# Patient Record
Sex: FEMALE | Race: WHITE
[De-identification: names, ages, dates, MRNs, and addresses within clinical notes are randomized per-mention and may not be internally consistent; named-entity substitution may affect disease eponyms.]

---

## 2019-01-02 ENCOUNTER — HOSPITAL ENCOUNTER (OUTPATIENT)
Dept: HOSPITAL 62 - SP | Age: 59
End: 2019-01-02
Attending: ORTHOPAEDIC SURGERY
Payer: COMMERCIAL

## 2019-01-02 DIAGNOSIS — D68.51: Primary | ICD-10-CM

## 2019-01-02 PROCEDURE — 93970 EXTREMITY STUDY: CPT

## 2019-01-02 NOTE — EKG REPORT
SEVERITY:- BORDERLINE ECG -

SINUS RHYTHM

BORDERLINE T ABNORMALITIES, ANTERIOR LEADS

:

Confirmed by: Edilia Omer MD 02-Jan-2019 17:18:10

## 2019-01-02 NOTE — XCELERA REPORT
[address]

                             [facility] [address]

                               Tel: [phone]

                               Fax: [phone]



                       Lower Extremity Venous Evaluation

_______________________________________________________________________________



_______________________________________________________________________________

Procedure: Color flow and duplex imaging bilaterally of the veins of the lower

extremities as well as the Common Femoral veins.





_______________________________________________________________________________



_______________________________________________________________________________



Right Sided Venous Evaluation

Normal vessel filling wall to wall, compression and augmentation as well as

Colour flow down to the infrageniculate veins.



Left Sided Venous Evaluation

Normal vessel filling wall to wall, compression and augmentation as well as

Colour flow down to the infrageniculate veins.



_______________________________________________________________________________



Interpretation Summary

No duplex evidence of DVT or obstruction in the bilateral lower extremities.

_______________________________________________________________________________



Name: EARL GEORGE

MRN: [id number]

Age: 58 yrs

Gender: Female                                         : 1960

Patient Status: Outpatient                             Patient Location: 

Account #: [id number]

Study Date: 2019 10:01 AM

                            Accession #: [id number]

Reason For Study: ACTIVATED PROTEIN C RESISTANCE

Ordering Physician: LOTTIE JON

Performed By: Malathi Martinez

Electronically signed by:      Lennox Williams      on 2019 12:13 PM



CC: LOTTIE JON

>

Williams, Lennox

## 2019-01-09 ENCOUNTER — HOSPITAL ENCOUNTER (OUTPATIENT)
Dept: HOSPITAL 62 - OROUT | Age: 59
Discharge: HOME | End: 2019-01-09
Attending: ORTHOPAEDIC SURGERY
Payer: COMMERCIAL

## 2019-01-09 VITALS — DIASTOLIC BLOOD PRESSURE: 87 MMHG | SYSTOLIC BLOOD PRESSURE: 133 MMHG

## 2019-01-09 DIAGNOSIS — M48.062: ICD-10-CM

## 2019-01-09 DIAGNOSIS — E78.00: ICD-10-CM

## 2019-01-09 DIAGNOSIS — M51.17: ICD-10-CM

## 2019-01-09 DIAGNOSIS — M51.27: Primary | ICD-10-CM

## 2019-01-09 DIAGNOSIS — M51.36: ICD-10-CM

## 2019-01-09 PROCEDURE — 93010 ELECTROCARDIOGRAM REPORT: CPT

## 2019-01-09 PROCEDURE — 87070 CULTURE OTHR SPECIMN AEROBIC: CPT

## 2019-01-09 PROCEDURE — 93005 ELECTROCARDIOGRAM TRACING: CPT

## 2019-01-09 PROCEDURE — 63047 LAM FACETEC & FORAMOT LUMBAR: CPT

## 2019-01-09 PROCEDURE — 72020 X-RAY EXAM OF SPINE 1 VIEW: CPT

## 2019-01-09 PROCEDURE — C9290 INJ, BUPIVACAINE LIPOSOME: HCPCS

## 2019-01-09 RX ADMIN — FENTANYL CITRATE ONE MCG: 50 INJECTION INTRAMUSCULAR; INTRAVENOUS at 10:02

## 2019-01-09 RX ADMIN — FENTANYL CITRATE ONE MCG: 50 INJECTION INTRAMUSCULAR; INTRAVENOUS at 10:14

## 2019-01-09 NOTE — RADIOLOGY REPORT (SQ)
EXAM DESCRIPTION:  NO CHG FLUORO; SPINE SINGLE VIEW



COMPLETED DATE/TIME:  1/9/2019 10:31 am



REASON FOR STUDY:  LUMBAR LAMINECTOMY ASST W/ FLUORO IN OR M51.26  OTHER INTERVERTEBRAL DISC DISPLACE
MENT, LUMBAR REGION M48.062  SPINAL STENOSIS, LUMBAR REGION WITH NEUROGENIC RACHEL M51.36  OTHER INTER
VERTEBRAL DISC DEGENERATION, LUMBAR REGION



COMPARISON:  None.



FLUOROSCOPY TIME:  0.1

1 images saved to PACS



TECHNIQUE:  Intra-operative images acquired during surgical procedure to evaluate progress.

NUMBER OF IMAGES: 1



LIMITATIONS:  None.



FINDINGS:  Intraoperative fluoro image was obtained to evaluate progress.  Please see operative repor
t for detailed description.



IMPRESSION:  IMAGE(S) OBTAINED DURING PROCEDURE.  Please see operative report for detailed descriptio
n.



COMMENT:  Quality :  Final reports for procedures using fluoroscopy that document radiation exp
osure indices, or exposure time and number of fluorographic images (if radiation exposure indices are
 not available)

Please consult full operative report of the attending physician for description of the procedure.



TECHNICAL DOCUMENTATION:  JOB ID:  [id number]

 2011 [employer]- All Rights Reserved



Reading location - IP/workstation name: [facility]

## 2019-01-09 NOTE — OPERATIVE REPORT E
Operative Report



NAME: EARL GEORGE

MRN:  [id number]          : 1960 AGE:  58Y

DATE OF SURGERY: 2019                        ROOM:



PREOPERATIVE DIAGNOSIS:

L5-S1 stenosis, L5-S1 herniated nucleus pulposus, and L5 and S1

radiculitis right-sided, back pain, degenerative joint disease.



POSTOPERATIVE DIAGNOSIS:

L5-S1 stenosis, L5-S1 herniated nucleus pulposus, and L5 and S1

radiculitis right-sided, back pain, degenerative joint disease.



OPERATION:

L5 partial laminectomy, L5-S1 right-sided microdiskectomy.



SURGEON:

LOTTIE JON M.D.



ASSISTANT:

Aquiles Gardner PA-C



OPERATIVE INDICATIONS:

The patient is a 58-year-old female with significant numbness and weakness

in the right lower extremity.  After discussion with the patient of the

risks, indications, and alternatives including the risk of infection,

bleeding, damage to nerves or blood vessels, the risk of spinal headaches,

the risk of continued back pain, the patient understood these risks and

wished to pursue the option of surgical intervention.



PROCEDURE:

The patient was brought into the room and placed under anesthesia. 

Received 2 grams of Ancef within one hour of cut time.  Had SCDs and a

warm blanket placed on her.  The patient was positioned on the radiolucent

table with a Efrem frame attachment.  The patient was placed in

appropriate position on the Efrem frame.  The Efrem frame was turned up

to open up the interspaces serially.  Under C-arm fluoroscopy in the

lateral position, the L5-S1 level was marked.  Upon marking that level and

marking the skin, after completion of prepping and draping, a midline

incision was carried out through the skin.  Dissection was carried down to

the lumbodorsal fascia.  The lumbodorsal fascia was incised on both sides

of the spinous processes, down to L5 and S1, down into the lamina of L5

and S1.  A Kocher was used to nishant the posterior interspace, and upon

verification with lateral C-arm fluoroscopy, the posterior interspace was

verified.  The spinous process of L5 was resected, and then the microscope

was brought in.  Under the microscope and with the assistance of Aquiles Gardner, partial laminectomy was performed and partial facetectomy and

lateral recess decompression was carried out, especially on the right

side.  There was significant stenosis and the dura was identified, and

nerve roots identified.  The dura and the nerve root were distracted

medially with the assistance of Aquiles Gardner, under the microscope.  The

disk space was identified.  The needle was placed in the disk space. 

Lateral C-arm fluoroscopy was used to verify that level, and upon

verification of that level, *------* was performed.  Many small and large

degenerative pieces of disk were resected, and then the dura was found to

rest more ventrally.  After that, using bipolar electrocautery to

coagulate small *------* epidural bleeders, the Valsalva maneuver was

performed by Anesthesia noting no cerebrospinal fluid leakage.  The wound

was irrigated with 1 liter of Bacitracin irrigation.  The fascia was

reapproximated with 0 Vicryl.  Deep and superficial soft tissues are

infiltrated with 0.5% Marcaine of 20 mL, and 20 mL of 1.3% EXPAREL.  The

subcu was reapproximated with 2-0 Vicryl, and the skin with running

subcuticular 3-0 Monocryl closure.  Wounds were dressed in benzoin,

Steri-Strips, 4 x 4, and tape.  The patient tolerated the procedure well. 

Condition is stable.  Disposition to recovery room.  Please note this

procedure could not have been done without the assistance of Aquiles Gardner PA-C.



DICTATING PHYSICIAN:  LOTTIE JON M.D.





[id number]                  DT: 2019    [id number]

KHLOE#: [id number]            DD: 2019    0959

ID:   [id number]           JOB#: [id number]       ACCT: [id number]



cc:LOTTIE JON M.D.

>

## 2019-01-09 NOTE — RADIOLOGY REPORT (SQ)
EXAM DESCRIPTION:  NO CHG FLUORO; SPINE SINGLE VIEW



COMPLETED DATE/TIME:  1/9/2019 10:31 am



REASON FOR STUDY:  LUMBAR LAMINECTOMY ASST W/ FLUORO IN OR M51.26  OTHER INTERVERTEBRAL DISC DISPLACE
MENT, LUMBAR REGION M48.062  SPINAL STENOSIS, LUMBAR REGION WITH NEUROGENIC RACHEL M51.36  OTHER INTER
VERTEBRAL DISC DEGENERATION, LUMBAR REGION



COMPARISON:  None.



FLUOROSCOPY TIME:  0.1

1 images saved to PACS



TECHNIQUE:  Intra-operative images acquired during surgical procedure to evaluate progress.

NUMBER OF IMAGES: 1



LIMITATIONS:  None.



FINDINGS:  Intraoperative fluoro image was obtained to evaluate progress.  Please see operative repor
t for detailed description.



IMPRESSION:  IMAGE(S) OBTAINED DURING PROCEDURE.  Please see operative report for detailed descriptio
n.



COMMENT:  Quality :  Final reports for procedures using fluoroscopy that document radiation exp
osure indices, or exposure time and number of fluorographic images (if radiation exposure indices are
 not available)

Please consult full operative report of the attending physician for description of the procedure.



TECHNICAL DOCUMENTATION:  JOB ID:  [id number]

 2011 [employer]- All Rights Reserved



Reading location - IP/workstation name: [facility]